# Patient Record
Sex: MALE | Race: WHITE | NOT HISPANIC OR LATINO
[De-identification: names, ages, dates, MRNs, and addresses within clinical notes are randomized per-mention and may not be internally consistent; named-entity substitution may affect disease eponyms.]

---

## 2017-12-02 PROBLEM — Z00.00 ENCOUNTER FOR PREVENTIVE HEALTH EXAMINATION: Status: ACTIVE | Noted: 2017-12-02

## 2021-12-18 ENCOUNTER — TRANSCRIPTION ENCOUNTER (OUTPATIENT)
Age: 31
End: 2021-12-18

## 2023-11-28 ENCOUNTER — EMERGENCY (EMERGENCY)
Facility: HOSPITAL | Age: 33
LOS: 1 days | Discharge: HOME | End: 2023-11-28
Attending: EMERGENCY MEDICINE
Payer: COMMERCIAL

## 2023-11-28 VITALS
WEIGHT: 210.1 LBS | RESPIRATION RATE: 20 BRPM | OXYGEN SATURATION: 99 % | HEIGHT: 69 IN | HEART RATE: 68 BPM | SYSTOLIC BLOOD PRESSURE: 161 MMHG | DIASTOLIC BLOOD PRESSURE: 100 MMHG | TEMPERATURE: 98 F

## 2023-11-28 VITALS
SYSTOLIC BLOOD PRESSURE: 142 MMHG | OXYGEN SATURATION: 96 % | DIASTOLIC BLOOD PRESSURE: 90 MMHG | TEMPERATURE: 98 F | HEART RATE: 69 BPM | RESPIRATION RATE: 18 BRPM

## 2023-11-28 PROCEDURE — 99284 EMERGENCY DEPT VISIT MOD MDM: CPT | Mod: 25

## 2023-11-28 PROCEDURE — 99282 EMERGENCY DEPT VISIT SF MDM: CPT | Mod: 25

## 2023-11-28 PROCEDURE — 12011 RPR F/E/E/N/L/M 2.5 CM/<: CPT

## 2023-11-28 NOTE — ED ADULT NURSE NOTE - OBJECTIVE STATEMENT
32y/o male presents to ED from home with c/o head laceration. Pt states he was working with a wrench when it slipped and hit him in the forehead. Pt took tylenol which improved pain. Denies SOB, CP. N/V, vision changes. Safety maintained bed in lowest position and locked.

## 2023-11-28 NOTE — ED PROVIDER NOTE - CHILD ABUSE FACILITY
[facility], formerly [facility]

## 2023-11-28 NOTE — ED PROVIDER NOTE - PHYSICAL EXAMINATION
CONSTITUTIONAL: Patient is awake, alert and oriented x 3. Patient is well appearing and in no acute distress  HEAD/FACE: Approx 1.5cm semicircular laceration to the superior aspect of the midline forehead with minimal bleeding otherwise NCAT  EYES: PERRL b/l, EOMI  NECK: supple, FROM, no midline cervical ttp   LUNGS: CTA b/l, no wheezing or rales   HEART: RRR.+S1S2 no murmurs  EXTREMITY: FROM upper and lower ext b/l  SKIN: see HEAD/FACE otherwise with no rash or lesions  NEURO: Cn3-12 grossly intact. Strength 5/5 UE/LE b/l. Normal gross sensation UE/LE b/l. Gait normal CONSTITUTIONAL: Patient is awake, alert and oriented x 3. Patient is well appearing and in no acute distress  HEAD/FACE: Approx 1.5cm semicircular laceration to the superior aspect of the midline forehead with minimal bleeding otherwise NCAT  EYES: PERRL b/l, EOMI  EARS: TM clear b/l without evidence of hemotympanum   NECK: supple, FROM, no midline cervical ttp   LUNGS: CTA b/l, no wheezing or rales   HEART: RRR.+S1S2 no murmurs  EXTREMITY: FROM upper and lower ext b/l  SKIN: see HEAD/FACE otherwise with no rash or lesions  NEURO: Cn3-12 grossly intact. Strength 5/5 UE/LE b/l. Normal gross sensation UE/LE b/l. Gait normal

## 2023-11-28 NOTE — ED ADULT NURSE NOTE - NSFALLUNIVINTERV_ED_ALL_ED
Bed/Stretcher in lowest position, wheels locked, appropriate side rails in place/Call bell, personal items and telephone in reach/Instruct patient to call for assistance before getting out of bed/chair/stretcher/Non-slip footwear applied when patient is off stretcher/[place] to call system/Physically safe environment - no spills, clutter or unnecessary equipment/Purposeful proactive rounding/Room/bathroom lighting operational, light cord in reach

## 2023-11-28 NOTE — ED PROVIDER NOTE - NSFOLLOWUPINSTRUCTIONS_ED_ALL_ED_FT
YOU WERE SEEN IN THE ED FOR: forehead laceration    WHILE YOU WERE HERE, YOU HAD: repair of your forehead laceration.  We discussed possible concussion.    FOR PAIN, YOU MAY TAKE TYLENOL (ACETAMINOPHEN) AND/OR IBUPROFEN (Advil or Motrin). FOLLOW THE INSTRUCTIONS ON THE LABEL/CONTAINER.  DO NOT EXCEED 4000MG OF TYLENOL (ACETAMINOPHEN) IN A 24 HOUR PERIOD.    PLEASE FOLLOW UP WITH YOUR PRIVATE PHYSICIAN WITHIN THE NEXT 48 HOURS. Please set up follow up with the Concussion Program by calling [phone]. BRING COPIES OF YOUR RESULTS.    RETURN TO THE EMERGENCY DEPARTMENT IF YOU EXPERIENCE ANY NEW/CONCERNING/WORSENING SYMPTOMS SUCH AS BUT NOT LIMITED TO: worsening headache, change in vision, double vision, sudden loss of vision, trouble walking, coordination gets worse, unusual behavior changes, severe or worsening headache, weakness or numbness in any part of your body, slurred speech, or confusion, vomit repeatedly, loss of consciousness, seizure, are sleepier than normal, or are difficult to wake up, or any other concerns.    Concussion, Adult  Three rear views of the head showing how quick, sudden head movements injure the brain.  A concussion is a brain injury from a hard, direct hit (trauma) to the head or body. This direct hit causes the brain to shake quickly back and forth inside the skull. This can damage brain cells and cause chemical changes in the brain. A concussion may also be known as a mild traumatic brain injury (TBI).    The effects of a concussion can be serious. If you have a concussion, you should be very careful to avoid having a second concussion.    What are the causes?  This condition is caused by:  A direct hit to your head.  Sudden movement of your body that causes your brain to move back and forth inside the skull, such as in a car crash.  What are the signs or symptoms?  The signs of a concussion can be hard to notice. Early on, they may be missed by you, family members, and health care providers. You may look fine on the outside but may act or feel differently.    Every head injury is different. Symptoms are usually temporary but may last for days, weeks, or even months. Some symptoms appear right away, but other symptoms may not show up for hours or days.    Physical symptoms    Headaches.  Dizziness and problems with coordination or balance.  Sensitivity to light or noise.  Nausea or vomiting.  Tiredness (fatigue).  Vision or hearing problems.  Seizure.  Mental and emotional symptoms    Irritability or mood changes.  Memory problems.  Trouble concentrating, organizing, or making decisions.  Changes in eating or sleeping patterns.  Slowness in thinking, acting or reacting, speaking, or reading.  Anxiety or depression.  How is this diagnosed?  This condition is diagnosed based on your symptoms and injury.    You may also have tests, including:  Imaging tests, such as a CT scan or an MRI.  Neuropsychological tests. These measure your thinking, understanding, learning, and memory.  How is this treated?  Treatment for this condition includes:  Stopping sports or activity if you are injured.  Physical and mental rest and careful observation, usually at home.  Medicines to help with symptoms such as headaches, nausea, or difficulty sleeping.  Referral to a concussion clinic or rehab center.  Follow these instructions at home:  Activity    Limit activities that require a lot of thought or concentration, such as:  Doing homework or job-related work.  Watching TV.  Using the computer or phone.  Playing memory games and doing puzzles.  Rest helps your brain heal. Make sure you:  Get plenty of sleep. Most adults should get 7–9 hours of sleep each night.  Rest during the day. Take naps or rest breaks when you feel tired.  Avoid high-intensity exercise or physical activities that take a lot of effort. Stop any activity that worsens symptoms. Your health care provider may recommend light exercise such as walking.  Do not do high-risk activities that could cause a second concussion, such as riding a bike or playing sports.  Ask your health care provider when you can return to your normal activities, such as school, work, sports, and driving. Your ability to react may be slower after a brain injury. Never do these activities if you are dizzy.  General instructions    A bottle of beer, a glass of wine, and a glass of hard liquor with a "do not drink" sign over them.   Take over-the-counter and prescription medicines only as told by your health care provider. Some medicines, such as blood thinners (anticoagulants) and aspirin, may increase the risk for complications, such as bleeding.  Avoid taking opioid pain medicine while recovering from a concussion.  Do not drink alcohol until your health care provider says you can. Drinking alcohol may slow your recovery and can put you at risk of further injury.  Watch your symptoms and tell others around you to do the same. Complications sometimes occur after a concussion.  Tell your , teachers, school nurse, school counselor, , or  about your injury, symptoms, and restrictions.  See a mental health therapist if you feel anxious or depressed. Managing this condition can be challenging.  Keep all follow-up visits. Your health care provider will check on your recovery and give you a plan for returning to activities.  How is this prevented?  Avoiding another brain injury is very important. In rare cases, another injury can lead to permanent brain damage, brain swelling, or death. The risk of this is greatest during the first 7–10 days after a head injury. Avoid injuries by:  Stopping activities that could lead to a second concussion, such as contact or recreational sports, until your health care provider says it is okay.  Taking these actions once you have returned to sports or activities:  Avoid plays or moves that can cause you to crash into another person. This is how most concussions occur.  Follow the rules and be respectful of other players. Do not engage in violent or illegal plays.  Getting regular exercise that includes strength and balance training.  Wearing a properly fitting helmet during sports, biking, or other activities. Helmets can help protect you from serious skull and brain injuries, but they may not protect you from a concussion. Even when wearing a helmet, you should avoid being hit in the head.  Where to find more information  Centers for Disease Control and Prevention: cdc.gov  Contact a health care provider if:  Your symptoms do not improve or get worse.  You have new symptoms.  You have another injury.  Your coordination gets worse.  You have unusual behavior changes.  Get help right away if:  You have a severe or worsening headache.  You have weakness or numbness in any part of your body, slurred speech, vision changes, or confusion.  You vomit repeatedly.  You lose consciousness, are sleepier than normal, or are difficult to wake up.  You have a seizure.  These symptoms may be an emergency. Get help right away. Call 911.  Do not wait to see if the symptoms will go away.  Do not drive yourself to the hospital.  Also, get help right away if:  You have thoughts of hurting yourself or others.  Take one of these steps if you feel like you may hurt yourself or others, or have thoughts about taking your own life:  Go to your nearest emergency room.  Call 911.  Call the National Suicide Prevention Lifeline at 0-330-805-4691-015-2860 yd 850. This is open 24 hours a day.  Text the Crisis Text Line at 570591.  This information is not intended to replace advice given to you by your health care provider. Make sure you discuss any questions you have with your health care provider. YOU WERE SEEN IN THE ED FOR: forehead laceration    WHILE YOU WERE HERE, YOU HAD: repair of your forehead laceration.  We discussed possible concussion.    FOR PAIN, YOU MAY TAKE TYLENOL (ACETAMINOPHEN) AND/OR IBUPROFEN (Advil or Motrin). FOLLOW THE INSTRUCTIONS ON THE LABEL/CONTAINER.  DO NOT EXCEED 4000MG OF TYLENOL (ACETAMINOPHEN) IN A 24 HOUR PERIOD.    PLEASE FOLLOW UP WITH YOUR PRIVATE PHYSICIAN WITHIN THE NEXT 48 HOURS. Please set up follow up with the Concussion Program by calling [phone]. BRING COPIES OF YOUR RESULTS.    RETURN TO THE EMERGENCY DEPARTMENT IF YOU EXPERIENCE ANY NEW/CONCERNING/WORSENING SYMPTOMS SUCH AS BUT NOT LIMITED TO: worsening headache, change in vision, double vision, sudden loss of vision, trouble walking, coordination gets worse, unusual behavior changes, severe or worsening headache, weakness or numbness in any part of your body, slurred speech, or confusion, vomit repeatedly, loss of consciousness, seizure, are sleepier than normal, or are difficult to wake up, or any other concerns.    Concussion, Adult  Three rear views of the head showing how quick, sudden head movements injure the brain.  A concussion is a brain injury from a hard, direct hit (trauma) to the head or body. This direct hit causes the brain to shake quickly back and forth inside the skull. This can damage brain cells and cause chemical changes in the brain. A concussion may also be known as a mild traumatic brain injury (TBI).    The effects of a concussion can be serious. If you have a concussion, you should be very careful to avoid having a second concussion.    What are the causes?  This condition is caused by:  A direct hit to your head.  Sudden movement of your body that causes your brain to move back and forth inside the skull, such as in a car crash.  What are the signs or symptoms?  The signs of a concussion can be hard to notice. Early on, they may be missed by you, family members, and health care providers. You may look fine on the outside but may act or feel differently.    Every head injury is different. Symptoms are usually temporary but may last for days, weeks, or even months. Some symptoms appear right away, but other symptoms may not show up for hours or days.    Physical symptoms    Headaches.  Dizziness and problems with coordination or balance.  Sensitivity to light or noise.  Nausea or vomiting.  Tiredness (fatigue).  Vision or hearing problems.  Seizure.  Mental and emotional symptoms    Irritability or mood changes.  Memory problems.  Trouble concentrating, organizing, or making decisions.  Changes in eating or sleeping patterns.  Slowness in thinking, acting or reacting, speaking, or reading.  Anxiety or depression.  How is this diagnosed?  This condition is diagnosed based on your symptoms and injury.    You may also have tests, including:  Imaging tests, such as a CT scan or an MRI.  Neuropsychological tests. These measure your thinking, understanding, learning, and memory.  How is this treated?  Treatment for this condition includes:  Stopping sports or activity if you are injured.  Physical and mental rest and careful observation, usually at home.  Medicines to help with symptoms such as headaches, nausea, or difficulty sleeping.  Referral to a concussion clinic or rehab center.  Follow these instructions at home:  Activity    Limit activities that require a lot of thought or concentration, such as:  Doing homework or job-related work.  Watching TV.  Using the computer or phone.  Playing memory games and doing puzzles.  Rest helps your brain heal. Make sure you:  Get plenty of sleep. Most adults should get 7–9 hours of sleep each night.  Rest during the day. Take naps or rest breaks when you feel tired.  Avoid high-intensity exercise or physical activities that take a lot of effort. Stop any activity that worsens symptoms. Your health care provider may recommend light exercise such as walking.  Do not do high-risk activities that could cause a second concussion, such as riding a bike or playing sports.  Ask your health care provider when you can return to your normal activities, such as school, work, sports, and driving. Your ability to react may be slower after a brain injury. Never do these activities if you are dizzy.  General instructions    A bottle of beer, a glass of wine, and a glass of hard liquor with a "do not drink" sign over them.   Take over-the-counter and prescription medicines only as told by your health care provider. Some medicines, such as blood thinners (anticoagulants) and aspirin, may increase the risk for complications, such as bleeding.  Avoid taking opioid pain medicine while recovering from a concussion.  Do not drink alcohol until your health care provider says you can. Drinking alcohol may slow your recovery and can put you at risk of further injury.  Watch your symptoms and tell others around you to do the same. Complications sometimes occur after a concussion.  Tell your , teachers, school nurse, school counselor, , or  about your injury, symptoms, and restrictions.  See a mental health therapist if you feel anxious or depressed. Managing this condition can be challenging.  Keep all follow-up visits. Your health care provider will check on your recovery and give you a plan for returning to activities.  How is this prevented?  Avoiding another brain injury is very important. In rare cases, another injury can lead to permanent brain damage, brain swelling, or death. The risk of this is greatest during the first 7–10 days after a head injury. Avoid injuries by:  Stopping activities that could lead to a second concussion, such as contact or recreational sports, until your health care provider says it is okay.  Taking these actions once you have returned to sports or activities:  Avoid plays or moves that can cause you to crash into another person. This is how most concussions occur.  Follow the rules and be respectful of other players. Do not engage in violent or illegal plays.  Getting regular exercise that includes strength and balance training.  Wearing a properly fitting helmet during sports, biking, or other activities. Helmets can help protect you from serious skull and brain injuries, but they may not protect you from a concussion. Even when wearing a helmet, you should avoid being hit in the head.  Where to find more information  Centers for Disease Control and Prevention: cdc.gov  Contact a health care provider if:  Your symptoms do not improve or get worse.  You have new symptoms.  You have another injury.  Your coordination gets worse.  You have unusual behavior changes.  Get help right away if:  You have a severe or worsening headache.  You have weakness or numbness in any part of your body, slurred speech, vision changes, or confusion.  You vomit repeatedly.  You lose consciousness, are sleepier than normal, or are difficult to wake up.  You have a seizure.  These symptoms may be an emergency. Get help right away. Call 911.  Do not wait to see if the symptoms will go away.  Do not drive yourself to the hospital.  Also, get help right away if:  You have thoughts of hurting yourself or others.  Take one of these steps if you feel like you may hurt yourself or others, or have thoughts about taking your own life:  Go to your nearest emergency room.  Call 911.  Call the National Suicide Prevention Lifeline at 1-178-185-8419-025-1459 eo 932. This is open 24 hours a day.  Text the Crisis Text Line at 200152.  This information is not intended to replace advice given to you by your health care provider. Make sure you discuss any questions you have with your health care provider. YOU WERE SEEN IN THE ED FOR: forehead laceration    WHILE YOU WERE HERE, YOU HAD: repair of your forehead laceration.  We discussed possible concussion.    FOR PAIN, YOU MAY TAKE TYLENOL (ACETAMINOPHEN) AND/OR IBUPROFEN (Advil or Motrin). FOLLOW THE INSTRUCTIONS ON THE LABEL/CONTAINER.  DO NOT EXCEED 4000MG OF TYLENOL (ACETAMINOPHEN) IN A 24 HOUR PERIOD.    PLEASE FOLLOW UP WITH YOUR PRIVATE PHYSICIAN WITHIN THE NEXT 48 HOURS. Please set up follow up with the Concussion Program by calling [phone]. BRING COPIES OF YOUR RESULTS.    RETURN TO THE EMERGENCY DEPARTMENT IF YOU EXPERIENCE ANY NEW/CONCERNING/WORSENING SYMPTOMS SUCH AS BUT NOT LIMITED TO: worsening headache, change in vision, double vision, sudden loss of vision, trouble walking, coordination gets worse, unusual behavior changes, severe or worsening headache, weakness or numbness in any part of your body, slurred speech, or confusion, vomit repeatedly, loss of consciousness, seizure, are sleepier than normal, or are difficult to wake up, or any other concerns.    Concussion, Adult  Three rear views of the head showing how quick, sudden head movements injure the brain.  A concussion is a brain injury from a hard, direct hit (trauma) to the head or body. This direct hit causes the brain to shake quickly back and forth inside the skull. This can damage brain cells and cause chemical changes in the brain. A concussion may also be known as a mild traumatic brain injury (TBI).    The effects of a concussion can be serious. If you have a concussion, you should be very careful to avoid having a second concussion.    What are the causes?  This condition is caused by:  A direct hit to your head.  Sudden movement of your body that causes your brain to move back and forth inside the skull, such as in a car crash.  What are the signs or symptoms?  The signs of a concussion can be hard to notice. Early on, they may be missed by you, family members, and health care providers. You may look fine on the outside but may act or feel differently.    Every head injury is different. Symptoms are usually temporary but may last for days, weeks, or even months. Some symptoms appear right away, but other symptoms may not show up for hours or days.    Physical symptoms    Headaches.  Dizziness and problems with coordination or balance.  Sensitivity to light or noise.  Nausea or vomiting.  Tiredness (fatigue).  Vision or hearing problems.  Seizure.  Mental and emotional symptoms    Irritability or mood changes.  Memory problems.  Trouble concentrating, organizing, or making decisions.  Changes in eating or sleeping patterns.  Slowness in thinking, acting or reacting, speaking, or reading.  Anxiety or depression.  How is this diagnosed?  This condition is diagnosed based on your symptoms and injury.    You may also have tests, including:  Imaging tests, such as a CT scan or an MRI.  Neuropsychological tests. These measure your thinking, understanding, learning, and memory.  How is this treated?  Treatment for this condition includes:  Stopping sports or activity if you are injured.  Physical and mental rest and careful observation, usually at home.  Medicines to help with symptoms such as headaches, nausea, or difficulty sleeping.  Referral to a concussion clinic or rehab center.  Follow these instructions at home:  Activity    Limit activities that require a lot of thought or concentration, such as:  Doing homework or job-related work.  Watching TV.  Using the computer or phone.  Playing memory games and doing puzzles.  Rest helps your brain heal. Make sure you:  Get plenty of sleep. Most adults should get 7–9 hours of sleep each night.  Rest during the day. Take naps or rest breaks when you feel tired.  Avoid high-intensity exercise or physical activities that take a lot of effort. Stop any activity that worsens symptoms. Your health care provider may recommend light exercise such as walking.  Do not do high-risk activities that could cause a second concussion, such as riding a bike or playing sports.  Ask your health care provider when you can return to your normal activities, such as school, work, sports, and driving. Your ability to react may be slower after a brain injury. Never do these activities if you are dizzy.  General instructions    A bottle of beer, a glass of wine, and a glass of hard liquor with a "do not drink" sign over them.   Take over-the-counter and prescription medicines only as told by your health care provider. Some medicines, such as blood thinners (anticoagulants) and aspirin, may increase the risk for complications, such as bleeding.  Avoid taking opioid pain medicine while recovering from a concussion.  Do not drink alcohol until your health care provider says you can. Drinking alcohol may slow your recovery and can put you at risk of further injury.  Watch your symptoms and tell others around you to do the same. Complications sometimes occur after a concussion.  Tell your , teachers, school nurse, school counselor, , or  about your injury, symptoms, and restrictions.  See a mental health therapist if you feel anxious or depressed. Managing this condition can be challenging.  Keep all follow-up visits. Your health care provider will check on your recovery and give you a plan for returning to activities.  How is this prevented?  Avoiding another brain injury is very important. In rare cases, another injury can lead to permanent brain damage, brain swelling, or death. The risk of this is greatest during the first 7–10 days after a head injury. Avoid injuries by:  Stopping activities that could lead to a second concussion, such as contact or recreational sports, until your health care provider says it is okay.  Taking these actions once you have returned to sports or activities:  Avoid plays or moves that can cause you to crash into another person. This is how most concussions occur.  Follow the rules and be respectful of other players. Do not engage in violent or illegal plays.  Getting regular exercise that includes strength and balance training.  Wearing a properly fitting helmet during sports, biking, or other activities. Helmets can help protect you from serious skull and brain injuries, but they may not protect you from a concussion. Even when wearing a helmet, you should avoid being hit in the head.  Where to find more information  Centers for Disease Control and Prevention: cdc.gov  Contact a health care provider if:  Your symptoms do not improve or get worse.  You have new symptoms.  You have another injury.  Your coordination gets worse.  You have unusual behavior changes.  Get help right away if:  You have a severe or worsening headache.  You have weakness or numbness in any part of your body, slurred speech, vision changes, or confusion.  You vomit repeatedly.  You lose consciousness, are sleepier than normal, or are difficult to wake up.  You have a seizure.  These symptoms may be an emergency. Get help right away. Call 911.  Do not wait to see if the symptoms will go away.  Do not drive yourself to the hospital.  Also, get help right away if:  You have thoughts of hurting yourself or others.  Take one of these steps if you feel like you may hurt yourself or others, or have thoughts about taking your own life:  Go to your nearest emergency room.  Call 911.  Call the National Suicide Prevention Lifeline at 5-644-198-1769-794-3198 yz 292. This is open 24 hours a day.  Text the Crisis Text Line at 112090.  This information is not intended to replace advice given to you by your health care provider. Make sure you discuss any questions you have with your health care provider. YOU WERE SEEN IN THE ED FOR: forehead laceration    WHILE YOU WERE HERE, YOU HAD: repair of your forehead laceration.  We discussed possible concussion.    KEEP SUTURES CLEAN AND DRY FOR THE NEXT 24 HOURS, YOU MAY THEN WASH GENTLY WITH SOAP AND WATER    RETURN TO THE ED IN 5 DAYS FOR SUTURE REMOVAL     FOR PAIN, YOU MAY TAKE TYLENOL (ACETAMINOPHEN) AND/OR IBUPROFEN (Advil or Motrin). FOLLOW THE INSTRUCTIONS ON THE LABEL/CONTAINER.  DO NOT EXCEED 4000MG OF TYLENOL (ACETAMINOPHEN) IN A 24 HOUR PERIOD.    PLEASE FOLLOW UP WITH YOUR PRIVATE PHYSICIAN WITHIN THE NEXT 48 HOURS. YOUR BLOOD PRESSURE WAS ELEVATE IN THE ED AND THIS SHOULD BE DISCUSSED AT FOLLOW UP    Please set up follow up with the Concussion Program by calling [phone] if you have persistent Headaches, dizziness, trouble focusing or light sensitivity. BRING COPIES OF YOUR RESULTS.    RETURN TO THE EMERGENCY DEPARTMENT IF YOU EXPERIENCE ANY NEW/CONCERNING/WORSENING SYMPTOMS SUCH AS BUT NOT LIMITED TO: worsening headache, change in vision, double vision, sudden loss of vision, trouble walking, coordination gets worse, unusual behavior changes, severe or worsening headache, weakness or numbness in any part of your body, slurred speech, or confusion, vomit repeatedly, loss of consciousness, seizure, are sleepier than normal, or are difficult to wake up, or any other concerns.    Concussion, Adult  Three rear views of the head showing how quick, sudden head movements injure the brain.  A concussion is a brain injury from a hard, direct hit (trauma) to the head or body. This direct hit causes the brain to shake quickly back and forth inside the skull. This can damage brain cells and cause chemical changes in the brain. A concussion may also be known as a mild traumatic brain injury (TBI).    The effects of a concussion can be serious. If you have a concussion, you should be very careful to avoid having a second concussion.    What are the causes?  This condition is caused by:  A direct hit to your head.  Sudden movement of your body that causes your brain to move back and forth inside the skull, such as in a car crash.  What are the signs or symptoms?  The signs of a concussion can be hard to notice. Early on, they may be missed by you, family members, and health care providers. You may look fine on the outside but may act or feel differently.    Every head injury is different. Symptoms are usually temporary but may last for days, weeks, or even months. Some symptoms appear right away, but other symptoms may not show up for hours or days.    Physical symptoms    Headaches.  Dizziness and problems with coordination or balance.  Sensitivity to light or noise.  Nausea or vomiting.  Tiredness (fatigue).  Vision or hearing problems.  Seizure.  Mental and emotional symptoms    Irritability or mood changes.  Memory problems.  Trouble concentrating, organizing, or making decisions.  Changes in eating or sleeping patterns.  Slowness in thinking, acting or reacting, speaking, or reading.  Anxiety or depression.  How is this diagnosed?  This condition is diagnosed based on your symptoms and injury.    You may also have tests, including:  Imaging tests, such as a CT scan or an MRI.  Neuropsychological tests. These measure your thinking, understanding, learning, and memory.  How is this treated?  Treatment for this condition includes:  Stopping sports or activity if you are injured.  Physical and mental rest and careful observation, usually at home.  Medicines to help with symptoms such as headaches, nausea, or difficulty sleeping.  Referral to a concussion clinic or rehab center.  Follow these instructions at home:  Activity    Limit activities that require a lot of thought or concentration, such as:  Doing homework or job-related work.  Watching TV.  Using the computer or phone.  Playing memory games and doing puzzles.  Rest helps your brain heal. Make sure you:  Get plenty of sleep. Most adults should get 7–9 hours of sleep each night.  Rest during the day. Take naps or rest breaks when you feel tired.  Avoid high-intensity exercise or physical activities that take a lot of effort. Stop any activity that worsens symptoms. Your health care provider may recommend light exercise such as walking.  Do not do high-risk activities that could cause a second concussion, such as riding a bike or playing sports.  Ask your health care provider when you can return to your normal activities, such as school, work, sports, and driving. Your ability to react may be slower after a brain injury. Never do these activities if you are dizzy.  General instructions    A bottle of beer, a glass of wine, and a glass of hard liquor with a "do not drink" sign over them.   Take over-the-counter and prescription medicines only as told by your health care provider. Some medicines, such as blood thinners (anticoagulants) and aspirin, may increase the risk for complications, such as bleeding.  Avoid taking opioid pain medicine while recovering from a concussion.  Do not drink alcohol until your health care provider says you can. Drinking alcohol may slow your recovery and can put you at risk of further injury.  Watch your symptoms and tell others around you to do the same. Complications sometimes occur after a concussion.  Tell your , teachers, school nurse, school counselor, , or  about your injury, symptoms, and restrictions.  See a mental health therapist if you feel anxious or depressed. Managing this condition can be challenging.  Keep all follow-up visits. Your health care provider will check on your recovery and give you a plan for returning to activities.  How is this prevented?  Avoiding another brain injury is very important. In rare cases, another injury can lead to permanent brain damage, brain swelling, or death. The risk of this is greatest during the first 7–10 days after a head injury. Avoid injuries by:  Stopping activities that could lead to a second concussion, such as contact or recreational sports, until your health care provider says it is okay.  Taking these actions once you have returned to sports or activities:  Avoid plays or moves that can cause you to crash into another person. This is how most concussions occur.  Follow the rules and be respectful of other players. Do not engage in violent or illegal plays.  Getting regular exercise that includes strength and balance training.  Wearing a properly fitting helmet during sports, biking, or other activities. Helmets can help protect you from serious skull and brain injuries, but they may not protect you from a concussion. Even when wearing a helmet, you should avoid being hit in the head.  Where to find more information  Centers for Disease Control and Prevention: cdc.gov  Contact a health care provider if:  Your symptoms do not improve or get worse.  You have new symptoms.  You have another injury.  Your coordination gets worse.  You have unusual behavior changes.  Get help right away if:  You have a severe or worsening headache.  You have weakness or numbness in any part of your body, slurred speech, vision changes, or confusion.  You vomit repeatedly.  You lose consciousness, are sleepier than normal, or are difficult to wake up.  You have a seizure.  These symptoms may be an emergency. Get help right away. Call 911.  Do not wait to see if the symptoms will go away.  Do not drive yourself to the hospital.  Also, get help right away if:  You have thoughts of hurting yourself or others.  Take one of these steps if you feel like you may hurt yourself or others, or have thoughts about taking your own life:  Go to your nearest emergency room.  Call 911.  Call the National Suicide Prevention Lifeline at 1-644.495.8471 or 724. This is open 24 hours a day.  Text the Crisis Text Line at 183804.  This information is not intended to replace advice given to you by your health care provider. Make sure you discuss any questions you have with your health care provider. YOU WERE SEEN IN THE ED FOR: forehead laceration    WHILE YOU WERE HERE, YOU HAD: repair of your forehead laceration.  We discussed possible concussion.    KEEP SUTURES CLEAN AND DRY FOR THE NEXT 24 HOURS, YOU MAY THEN WASH GENTLY WITH SOAP AND WATER    RETURN TO THE ED IN 5 DAYS FOR SUTURE REMOVAL     FOR PAIN, YOU MAY TAKE TYLENOL (ACETAMINOPHEN) AND/OR IBUPROFEN (Advil or Motrin). FOLLOW THE INSTRUCTIONS ON THE LABEL/CONTAINER.  DO NOT EXCEED 4000MG OF TYLENOL (ACETAMINOPHEN) IN A 24 HOUR PERIOD.    PLEASE FOLLOW UP WITH YOUR PRIVATE PHYSICIAN WITHIN THE NEXT 48 HOURS. YOUR BLOOD PRESSURE WAS ELEVATE IN THE ED AND THIS SHOULD BE DISCUSSED AT FOLLOW UP    Please set up follow up with the Concussion Program by calling [phone] if you have persistent Headaches, dizziness, trouble focusing or light sensitivity. BRING COPIES OF YOUR RESULTS.    RETURN TO THE EMERGENCY DEPARTMENT IF YOU EXPERIENCE ANY NEW/CONCERNING/WORSENING SYMPTOMS SUCH AS BUT NOT LIMITED TO: worsening headache, change in vision, double vision, sudden loss of vision, trouble walking, coordination gets worse, unusual behavior changes, severe or worsening headache, weakness or numbness in any part of your body, slurred speech, or confusion, vomit repeatedly, loss of consciousness, seizure, are sleepier than normal, or are difficult to wake up, or any other concerns.    Concussion, Adult  Three rear views of the head showing how quick, sudden head movements injure the brain.  A concussion is a brain injury from a hard, direct hit (trauma) to the head or body. This direct hit causes the brain to shake quickly back and forth inside the skull. This can damage brain cells and cause chemical changes in the brain. A concussion may also be known as a mild traumatic brain injury (TBI).    The effects of a concussion can be serious. If you have a concussion, you should be very careful to avoid having a second concussion.    What are the causes?  This condition is caused by:  A direct hit to your head.  Sudden movement of your body that causes your brain to move back and forth inside the skull, such as in a car crash.  What are the signs or symptoms?  The signs of a concussion can be hard to notice. Early on, they may be missed by you, family members, and health care providers. You may look fine on the outside but may act or feel differently.    Every head injury is different. Symptoms are usually temporary but may last for days, weeks, or even months. Some symptoms appear right away, but other symptoms may not show up for hours or days.    Physical symptoms    Headaches.  Dizziness and problems with coordination or balance.  Sensitivity to light or noise.  Nausea or vomiting.  Tiredness (fatigue).  Vision or hearing problems.  Seizure.  Mental and emotional symptoms    Irritability or mood changes.  Memory problems.  Trouble concentrating, organizing, or making decisions.  Changes in eating or sleeping patterns.  Slowness in thinking, acting or reacting, speaking, or reading.  Anxiety or depression.  How is this diagnosed?  This condition is diagnosed based on your symptoms and injury.    You may also have tests, including:  Imaging tests, such as a CT scan or an MRI.  Neuropsychological tests. These measure your thinking, understanding, learning, and memory.  How is this treated?  Treatment for this condition includes:  Stopping sports or activity if you are injured.  Physical and mental rest and careful observation, usually at home.  Medicines to help with symptoms such as headaches, nausea, or difficulty sleeping.  Referral to a concussion clinic or rehab center.  Follow these instructions at home:  Activity    Limit activities that require a lot of thought or concentration, such as:  Doing homework or job-related work.  Watching TV.  Using the computer or phone.  Playing memory games and doing puzzles.  Rest helps your brain heal. Make sure you:  Get plenty of sleep. Most adults should get 7–9 hours of sleep each night.  Rest during the day. Take naps or rest breaks when you feel tired.  Avoid high-intensity exercise or physical activities that take a lot of effort. Stop any activity that worsens symptoms. Your health care provider may recommend light exercise such as walking.  Do not do high-risk activities that could cause a second concussion, such as riding a bike or playing sports.  Ask your health care provider when you can return to your normal activities, such as school, work, sports, and driving. Your ability to react may be slower after a brain injury. Never do these activities if you are dizzy.  General instructions    A bottle of beer, a glass of wine, and a glass of hard liquor with a "do not drink" sign over them.   Take over-the-counter and prescription medicines only as told by your health care provider. Some medicines, such as blood thinners (anticoagulants) and aspirin, may increase the risk for complications, such as bleeding.  Avoid taking opioid pain medicine while recovering from a concussion.  Do not drink alcohol until your health care provider says you can. Drinking alcohol may slow your recovery and can put you at risk of further injury.  Watch your symptoms and tell others around you to do the same. Complications sometimes occur after a concussion.  Tell your , teachers, school nurse, school counselor, , or  about your injury, symptoms, and restrictions.  See a mental health therapist if you feel anxious or depressed. Managing this condition can be challenging.  Keep all follow-up visits. Your health care provider will check on your recovery and give you a plan for returning to activities.  How is this prevented?  Avoiding another brain injury is very important. In rare cases, another injury can lead to permanent brain damage, brain swelling, or death. The risk of this is greatest during the first 7–10 days after a head injury. Avoid injuries by:  Stopping activities that could lead to a second concussion, such as contact or recreational sports, until your health care provider says it is okay.  Taking these actions once you have returned to sports or activities:  Avoid plays or moves that can cause you to crash into another person. This is how most concussions occur.  Follow the rules and be respectful of other players. Do not engage in violent or illegal plays.  Getting regular exercise that includes strength and balance training.  Wearing a properly fitting helmet during sports, biking, or other activities. Helmets can help protect you from serious skull and brain injuries, but they may not protect you from a concussion. Even when wearing a helmet, you should avoid being hit in the head.  Where to find more information  Centers for Disease Control and Prevention: cdc.gov  Contact a health care provider if:  Your symptoms do not improve or get worse.  You have new symptoms.  You have another injury.  Your coordination gets worse.  You have unusual behavior changes.  Get help right away if:  You have a severe or worsening headache.  You have weakness or numbness in any part of your body, slurred speech, vision changes, or confusion.  You vomit repeatedly.  You lose consciousness, are sleepier than normal, or are difficult to wake up.  You have a seizure.  These symptoms may be an emergency. Get help right away. Call 911.  Do not wait to see if the symptoms will go away.  Do not drive yourself to the hospital.  Also, get help right away if:  You have thoughts of hurting yourself or others.  Take one of these steps if you feel like you may hurt yourself or others, or have thoughts about taking your own life:  Go to your nearest emergency room.  Call 911.  Call the National Suicide Prevention Lifeline at 1-656.602.5797 or 745. This is open 24 hours a day.  Text the Crisis Text Line at 551276.  This information is not intended to replace advice given to you by your health care provider. Make sure you discuss any questions you have with your health care provider. YOU WERE SEEN IN THE ED FOR: forehead laceration    WHILE YOU WERE HERE, YOU HAD: repair of your forehead laceration.  We discussed possible concussion.    KEEP SUTURES CLEAN AND DRY FOR THE NEXT 24 HOURS, YOU MAY THEN WASH GENTLY WITH SOAP AND WATER    RETURN TO THE ED IN 5 DAYS FOR SUTURE REMOVAL     FOR PAIN, YOU MAY TAKE TYLENOL (ACETAMINOPHEN) AND/OR IBUPROFEN (Advil or Motrin). FOLLOW THE INSTRUCTIONS ON THE LABEL/CONTAINER.  DO NOT EXCEED 4000MG OF TYLENOL (ACETAMINOPHEN) IN A 24 HOUR PERIOD.    PLEASE FOLLOW UP WITH YOUR PRIVATE PHYSICIAN WITHIN THE NEXT 48 HOURS. YOUR BLOOD PRESSURE WAS ELEVATE IN THE ED AND THIS SHOULD BE DISCUSSED AT FOLLOW UP    Please set up follow up with the Concussion Program by calling [phone] if you have persistent Headaches, dizziness, trouble focusing or light sensitivity. BRING COPIES OF YOUR RESULTS.    RETURN TO THE EMERGENCY DEPARTMENT IF YOU EXPERIENCE ANY NEW/CONCERNING/WORSENING SYMPTOMS SUCH AS BUT NOT LIMITED TO: worsening headache, change in vision, double vision, sudden loss of vision, trouble walking, coordination gets worse, unusual behavior changes, severe or worsening headache, weakness or numbness in any part of your body, slurred speech, or confusion, vomit repeatedly, loss of consciousness, seizure, are sleepier than normal, or are difficult to wake up, or any other concerns.    Concussion, Adult  Three rear views of the head showing how quick, sudden head movements injure the brain.  A concussion is a brain injury from a hard, direct hit (trauma) to the head or body. This direct hit causes the brain to shake quickly back and forth inside the skull. This can damage brain cells and cause chemical changes in the brain. A concussion may also be known as a mild traumatic brain injury (TBI).    The effects of a concussion can be serious. If you have a concussion, you should be very careful to avoid having a second concussion.    What are the causes?  This condition is caused by:  A direct hit to your head.  Sudden movement of your body that causes your brain to move back and forth inside the skull, such as in a car crash.  What are the signs or symptoms?  The signs of a concussion can be hard to notice. Early on, they may be missed by you, family members, and health care providers. You may look fine on the outside but may act or feel differently.    Every head injury is different. Symptoms are usually temporary but may last for days, weeks, or even months. Some symptoms appear right away, but other symptoms may not show up for hours or days.    Physical symptoms    Headaches.  Dizziness and problems with coordination or balance.  Sensitivity to light or noise.  Nausea or vomiting.  Tiredness (fatigue).  Vision or hearing problems.  Seizure.  Mental and emotional symptoms    Irritability or mood changes.  Memory problems.  Trouble concentrating, organizing, or making decisions.  Changes in eating or sleeping patterns.  Slowness in thinking, acting or reacting, speaking, or reading.  Anxiety or depression.  How is this diagnosed?  This condition is diagnosed based on your symptoms and injury.    You may also have tests, including:  Imaging tests, such as a CT scan or an MRI.  Neuropsychological tests. These measure your thinking, understanding, learning, and memory.  How is this treated?  Treatment for this condition includes:  Stopping sports or activity if you are injured.  Physical and mental rest and careful observation, usually at home.  Medicines to help with symptoms such as headaches, nausea, or difficulty sleeping.  Referral to a concussion clinic or rehab center.  Follow these instructions at home:  Activity    Limit activities that require a lot of thought or concentration, such as:  Doing homework or job-related work.  Watching TV.  Using the computer or phone.  Playing memory games and doing puzzles.  Rest helps your brain heal. Make sure you:  Get plenty of sleep. Most adults should get 7–9 hours of sleep each night.  Rest during the day. Take naps or rest breaks when you feel tired.  Avoid high-intensity exercise or physical activities that take a lot of effort. Stop any activity that worsens symptoms. Your health care provider may recommend light exercise such as walking.  Do not do high-risk activities that could cause a second concussion, such as riding a bike or playing sports.  Ask your health care provider when you can return to your normal activities, such as school, work, sports, and driving. Your ability to react may be slower after a brain injury. Never do these activities if you are dizzy.  General instructions    A bottle of beer, a glass of wine, and a glass of hard liquor with a "do not drink" sign over them.   Take over-the-counter and prescription medicines only as told by your health care provider. Some medicines, such as blood thinners (anticoagulants) and aspirin, may increase the risk for complications, such as bleeding.  Avoid taking opioid pain medicine while recovering from a concussion.  Do not drink alcohol until your health care provider says you can. Drinking alcohol may slow your recovery and can put you at risk of further injury.  Watch your symptoms and tell others around you to do the same. Complications sometimes occur after a concussion.  Tell your , teachers, school nurse, school counselor, , or  about your injury, symptoms, and restrictions.  See a mental health therapist if you feel anxious or depressed. Managing this condition can be challenging.  Keep all follow-up visits. Your health care provider will check on your recovery and give you a plan for returning to activities.  How is this prevented?  Avoiding another brain injury is very important. In rare cases, another injury can lead to permanent brain damage, brain swelling, or death. The risk of this is greatest during the first 7–10 days after a head injury. Avoid injuries by:  Stopping activities that could lead to a second concussion, such as contact or recreational sports, until your health care provider says it is okay.  Taking these actions once you have returned to sports or activities:  Avoid plays or moves that can cause you to crash into another person. This is how most concussions occur.  Follow the rules and be respectful of other players. Do not engage in violent or illegal plays.  Getting regular exercise that includes strength and balance training.  Wearing a properly fitting helmet during sports, biking, or other activities. Helmets can help protect you from serious skull and brain injuries, but they may not protect you from a concussion. Even when wearing a helmet, you should avoid being hit in the head.  Where to find more information  Centers for Disease Control and Prevention: cdc.gov  Contact a health care provider if:  Your symptoms do not improve or get worse.  You have new symptoms.  You have another injury.  Your coordination gets worse.  You have unusual behavior changes.  Get help right away if:  You have a severe or worsening headache.  You have weakness or numbness in any part of your body, slurred speech, vision changes, or confusion.  You vomit repeatedly.  You lose consciousness, are sleepier than normal, or are difficult to wake up.  You have a seizure.  These symptoms may be an emergency. Get help right away. Call 911.  Do not wait to see if the symptoms will go away.  Do not drive yourself to the hospital.  Also, get help right away if:  You have thoughts of hurting yourself or others.  Take one of these steps if you feel like you may hurt yourself or others, or have thoughts about taking your own life:  Go to your nearest emergency room.  Call 911.  Call the National Suicide Prevention Lifeline at 1-200.902.8826 or 580. This is open 24 hours a day.  Text the Crisis Text Line at 653566.  This information is not intended to replace advice given to you by your health care provider. Make sure you discuss any questions you have with your health care provider.

## 2023-11-28 NOTE — ED PROVIDER NOTE - OBJECTIVE STATEMENT
33 year old male with no significant past medical history presents emerged from complaining of head injury.  Patient reports that he he was working on a pipe and using a bedsheet on the wrench approximately slipped striking him in the forehead causing a laceration.  He reports he had initial bleeding which has improved.  Patient initially presented to urgent care but was then referred to the emergency department for further evaluation.  Reports that he had a mild headache after accident which is improved with Tylenol.  Tetanus updated last year patient had a laceration to the right arm.  He denies other injuries, LOC, vision change, numbness, weakness, nausea or vomiting. 33 year old male with no significant past medical history presents emerged from complaining of head injury.  Patient reports that he was working on a pipe and using a bedsheet on the wrench approximately slipped striking him in the forehead causing a laceration.  He reports he had initial bleeding which has improved.  Patient initially presented to urgent care but was then referred to the emergency department for further evaluation.  Reports that he had a mild headache after accident which is improved with Tylenol.  Tetanus updated last year patient had a laceration to the right arm.  He denies other injuries, LOC, vision change, numbness, weakness, nausea or vomiting.

## 2023-11-28 NOTE — ED PROVIDER NOTE - PATIENT PORTAL LINK FT
You can access the FollowMyHealth Patient Portal offered by [facility] by registering at the following website: [email or web address]. By joining [employer]’s FollowMyHealth portal, you will also be able to view your health information using other applications (apps) compatible with our system. You can access the FollowMyHealth Patient Portal offered by [facility] by registering at the following website: [email or web address]. By joining [employer]’s FollowMyHealth portal, you will also be able to view your health information using other applications (apps) compatible with our system. You can access the FollowMyHealth Patient Portal offered by [facility] by registering at the following website: [email or web address]. By joining [employer]’s FollowMyHealth portal, you will also be able to view your health information using other applications (apps) compatible with our system.
